# Patient Record
Sex: FEMALE | Race: BLACK OR AFRICAN AMERICAN | NOT HISPANIC OR LATINO | URBAN - METROPOLITAN AREA
[De-identification: names, ages, dates, MRNs, and addresses within clinical notes are randomized per-mention and may not be internally consistent; named-entity substitution may affect disease eponyms.]

---

## 2020-10-01 ENCOUNTER — EMERGENCY (EMERGENCY)
Facility: HOSPITAL | Age: 35
LOS: 1 days | Discharge: HOME | End: 2020-10-01
Attending: EMERGENCY MEDICINE
Payer: MEDICAID

## 2020-10-01 VITALS
RESPIRATION RATE: 19 BRPM | TEMPERATURE: 98 F | HEIGHT: 65 IN | WEIGHT: 279.99 LBS | HEART RATE: 100 BPM | DIASTOLIC BLOOD PRESSURE: 82 MMHG | OXYGEN SATURATION: 96 % | SYSTOLIC BLOOD PRESSURE: 120 MMHG

## 2020-10-01 DIAGNOSIS — Z98.84 BARIATRIC SURGERY STATUS: Chronic | ICD-10-CM

## 2020-10-01 LAB
ALBUMIN SERPL ELPH-MCNC: 2.8 G/DL — LOW (ref 3.5–5)
ALP SERPL-CCNC: 67 U/L — SIGNIFICANT CHANGE UP (ref 40–120)
ALT FLD-CCNC: 18 U/L DA — SIGNIFICANT CHANGE UP (ref 10–60)
ANION GAP SERPL CALC-SCNC: 7 MMOL/L — SIGNIFICANT CHANGE UP (ref 5–17)
ANION GAP SERPL CALC-SCNC: 8 MMOL/L — SIGNIFICANT CHANGE UP (ref 5–17)
APPEARANCE UR: ABNORMAL
APTT BLD: 28.3 SEC — SIGNIFICANT CHANGE UP (ref 27.5–35.5)
AST SERPL-CCNC: 19 U/L — SIGNIFICANT CHANGE UP (ref 10–40)
B PERT DNA SPEC QL NAA+PROBE: SIGNIFICANT CHANGE UP
BASOPHILS # BLD AUTO: 0.02 K/UL — SIGNIFICANT CHANGE UP (ref 0–0.2)
BASOPHILS NFR BLD AUTO: 0.2 % — SIGNIFICANT CHANGE UP (ref 0–2)
BILIRUB SERPL-MCNC: 0.3 MG/DL — SIGNIFICANT CHANGE UP (ref 0.2–1.2)
BILIRUB UR-MCNC: NEGATIVE — SIGNIFICANT CHANGE UP
BUN SERPL-MCNC: 10 MG/DL — SIGNIFICANT CHANGE UP (ref 7–18)
BUN SERPL-MCNC: 11 MG/DL — SIGNIFICANT CHANGE UP (ref 7–18)
C PNEUM DNA SPEC QL NAA+PROBE: SIGNIFICANT CHANGE UP
CALCIUM SERPL-MCNC: 9.4 MG/DL — SIGNIFICANT CHANGE UP (ref 8.4–10.5)
CALCIUM SERPL-MCNC: 9.4 MG/DL — SIGNIFICANT CHANGE UP (ref 8.4–10.5)
CHLORIDE SERPL-SCNC: 94 MMOL/L — LOW (ref 96–108)
CHLORIDE SERPL-SCNC: 95 MMOL/L — LOW (ref 96–108)
CO2 SERPL-SCNC: 29 MMOL/L — SIGNIFICANT CHANGE UP (ref 22–31)
CO2 SERPL-SCNC: 32 MMOL/L — HIGH (ref 22–31)
COLOR SPEC: YELLOW — SIGNIFICANT CHANGE UP
CREAT SERPL-MCNC: 1 MG/DL — SIGNIFICANT CHANGE UP (ref 0.5–1.3)
CREAT SERPL-MCNC: 1.04 MG/DL — SIGNIFICANT CHANGE UP (ref 0.5–1.3)
DIFF PNL FLD: ABNORMAL
EOSINOPHIL # BLD AUTO: 0.02 K/UL — SIGNIFICANT CHANGE UP (ref 0–0.5)
EOSINOPHIL NFR BLD AUTO: 0.2 % — SIGNIFICANT CHANGE UP (ref 0–6)
FLUAV H1 2009 PAND RNA SPEC QL NAA+PROBE: SIGNIFICANT CHANGE UP
FLUAV H1 RNA SPEC QL NAA+PROBE: SIGNIFICANT CHANGE UP
FLUAV H3 RNA SPEC QL NAA+PROBE: SIGNIFICANT CHANGE UP
FLUAV SUBTYP SPEC NAA+PROBE: SIGNIFICANT CHANGE UP
FLUBV RNA SPEC QL NAA+PROBE: SIGNIFICANT CHANGE UP
GLUCOSE SERPL-MCNC: 105 MG/DL — HIGH (ref 70–99)
GLUCOSE SERPL-MCNC: 105 MG/DL — HIGH (ref 70–99)
GLUCOSE UR QL: NEGATIVE — SIGNIFICANT CHANGE UP
HADV DNA SPEC QL NAA+PROBE: SIGNIFICANT CHANGE UP
HCG UR QL: NEGATIVE — SIGNIFICANT CHANGE UP
HCOV PNL SPEC NAA+PROBE: SIGNIFICANT CHANGE UP
HCT VFR BLD CALC: 34 % — LOW (ref 34.5–45)
HGB BLD-MCNC: 11.4 G/DL — LOW (ref 11.5–15.5)
HMPV RNA SPEC QL NAA+PROBE: SIGNIFICANT CHANGE UP
HPIV1 RNA SPEC QL NAA+PROBE: SIGNIFICANT CHANGE UP
HPIV2 RNA SPEC QL NAA+PROBE: SIGNIFICANT CHANGE UP
HPIV3 RNA SPEC QL NAA+PROBE: SIGNIFICANT CHANGE UP
HPIV4 RNA SPEC QL NAA+PROBE: SIGNIFICANT CHANGE UP
IMM GRANULOCYTES NFR BLD AUTO: 0.5 % — SIGNIFICANT CHANGE UP (ref 0–1.5)
INR BLD: 1.2 RATIO — HIGH (ref 0.88–1.16)
KETONES UR-MCNC: ABNORMAL
LACTATE SERPL-SCNC: 3.2 MMOL/L — HIGH (ref 0.7–2)
LEUKOCYTE ESTERASE UR-ACNC: NEGATIVE — SIGNIFICANT CHANGE UP
LYMPHOCYTES # BLD AUTO: 1.04 K/UL — SIGNIFICANT CHANGE UP (ref 1–3.3)
LYMPHOCYTES # BLD AUTO: 12.2 % — LOW (ref 13–44)
MCHC RBC-ENTMCNC: 29.3 PG — SIGNIFICANT CHANGE UP (ref 27–34)
MCHC RBC-ENTMCNC: 33.5 GM/DL — SIGNIFICANT CHANGE UP (ref 32–36)
MCV RBC AUTO: 87.4 FL — SIGNIFICANT CHANGE UP (ref 80–100)
MONOCYTES # BLD AUTO: 0.53 K/UL — SIGNIFICANT CHANGE UP (ref 0–0.9)
MONOCYTES NFR BLD AUTO: 6.2 % — SIGNIFICANT CHANGE UP (ref 2–14)
NEUTROPHILS # BLD AUTO: 6.85 K/UL — SIGNIFICANT CHANGE UP (ref 1.8–7.4)
NEUTROPHILS NFR BLD AUTO: 80.7 % — HIGH (ref 43–77)
NITRITE UR-MCNC: NEGATIVE — SIGNIFICANT CHANGE UP
NRBC # BLD: 0 /100 WBCS — SIGNIFICANT CHANGE UP (ref 0–0)
PH UR: 6 — SIGNIFICANT CHANGE UP (ref 5–8)
PLATELET # BLD AUTO: 224 K/UL — SIGNIFICANT CHANGE UP (ref 150–400)
POTASSIUM SERPL-MCNC: 2.5 MMOL/L — CRITICAL LOW (ref 3.5–5.3)
POTASSIUM SERPL-MCNC: 2.8 MMOL/L — CRITICAL LOW (ref 3.5–5.3)
POTASSIUM SERPL-SCNC: 2.5 MMOL/L — CRITICAL LOW (ref 3.5–5.3)
POTASSIUM SERPL-SCNC: 2.8 MMOL/L — CRITICAL LOW (ref 3.5–5.3)
PROT SERPL-MCNC: 8.3 G/DL — SIGNIFICANT CHANGE UP (ref 6–8.3)
PROT UR-MCNC: 30 MG/DL
PROTHROM AB SERPL-ACNC: 13.9 SEC — HIGH (ref 10.6–13.6)
RAPID RVP RESULT: SIGNIFICANT CHANGE UP
RBC # BLD: 3.89 M/UL — SIGNIFICANT CHANGE UP (ref 3.8–5.2)
RBC # FLD: 14.5 % — SIGNIFICANT CHANGE UP (ref 10.3–14.5)
RSV RNA SPEC QL NAA+PROBE: SIGNIFICANT CHANGE UP
RV+EV RNA SPEC QL NAA+PROBE: SIGNIFICANT CHANGE UP
SARS-COV-2 RNA SPEC QL NAA+PROBE: SIGNIFICANT CHANGE UP
SODIUM SERPL-SCNC: 131 MMOL/L — LOW (ref 135–145)
SODIUM SERPL-SCNC: 134 MMOL/L — LOW (ref 135–145)
SP GR SPEC: 1.02 — SIGNIFICANT CHANGE UP (ref 1.01–1.02)
TROPONIN I SERPL-MCNC: <0.015 NG/ML — SIGNIFICANT CHANGE UP (ref 0–0.04)
UROBILINOGEN FLD QL: 1
WBC # BLD: 8.5 K/UL — SIGNIFICANT CHANGE UP (ref 3.8–10.5)
WBC # FLD AUTO: 8.5 K/UL — SIGNIFICANT CHANGE UP (ref 3.8–10.5)

## 2020-10-01 PROCEDURE — 71045 X-RAY EXAM CHEST 1 VIEW: CPT | Mod: 26

## 2020-10-01 PROCEDURE — 99291 CRITICAL CARE FIRST HOUR: CPT

## 2020-10-01 RX ORDER — POTASSIUM CHLORIDE 20 MEQ
10 PACKET (EA) ORAL
Refills: 0 | Status: COMPLETED | OUTPATIENT
Start: 2020-10-01 | End: 2020-10-02

## 2020-10-01 RX ORDER — CEFEPIME 1 G/1
2000 INJECTION, POWDER, FOR SOLUTION INTRAMUSCULAR; INTRAVENOUS ONCE
Refills: 0 | Status: COMPLETED | OUTPATIENT
Start: 2020-10-01 | End: 2020-10-01

## 2020-10-01 RX ORDER — SODIUM CHLORIDE 9 MG/ML
1750 INJECTION INTRAMUSCULAR; INTRAVENOUS; SUBCUTANEOUS ONCE
Refills: 0 | Status: COMPLETED | OUTPATIENT
Start: 2020-10-01 | End: 2020-10-01

## 2020-10-01 RX ORDER — POTASSIUM CHLORIDE 20 MEQ
40 PACKET (EA) ORAL ONCE
Refills: 0 | Status: COMPLETED | OUTPATIENT
Start: 2020-10-01 | End: 2020-10-01

## 2020-10-01 RX ORDER — VANCOMYCIN HCL 1 G
1000 VIAL (EA) INTRAVENOUS ONCE
Refills: 0 | Status: COMPLETED | OUTPATIENT
Start: 2020-10-01 | End: 2020-10-01

## 2020-10-01 RX ORDER — ACETAMINOPHEN 500 MG
650 TABLET ORAL ONCE
Refills: 0 | Status: COMPLETED | OUTPATIENT
Start: 2020-10-01 | End: 2020-10-01

## 2020-10-01 RX ADMIN — Medication 250 MILLIGRAM(S): at 22:00

## 2020-10-01 RX ADMIN — Medication 650 MILLIGRAM(S): at 22:28

## 2020-10-01 RX ADMIN — SODIUM CHLORIDE 1750 MILLILITER(S): 9 INJECTION INTRAMUSCULAR; INTRAVENOUS; SUBCUTANEOUS at 21:56

## 2020-10-01 RX ADMIN — CEFEPIME 100 MILLIGRAM(S): 1 INJECTION, POWDER, FOR SOLUTION INTRAMUSCULAR; INTRAVENOUS at 21:57

## 2020-10-01 RX ADMIN — Medication 650 MILLIGRAM(S): at 21:56

## 2020-10-01 RX ADMIN — CEFEPIME 2000 MILLIGRAM(S): 1 INJECTION, POWDER, FOR SOLUTION INTRAMUSCULAR; INTRAVENOUS at 22:55

## 2020-10-01 RX ADMIN — Medication 40 MILLIEQUIVALENT(S): at 21:56

## 2020-10-01 NOTE — ED PROVIDER NOTE - CLINICAL SUMMARY MEDICAL DECISION MAKING FREE TEXT BOX
34yoF with h/o HTN, anxiety, gastric bypass 2017, presents with whole body/muscle and joint pains and subjective fever x 4 days. This pain includes the joints of her chest and muscles of her abdomen, back, knees, arms - pt confirms she has no specific or worse or other chest or abdominal pain. On ROS reports mild SOB but thinks this is likely 2/2 anxiety, is very anxious that she may have COVID and states she can not go home until she has COVID results due to having a baby at home. Denies leg pain or swelling, rashes, ST, dysuria, vaginal discharge (other than being on her period now), recent long distance travel, cough, known COVID exposure or hx, and all other symptoms. On exam, febrile, hemodynamically stable, saturating well on RA, NAD, well appearing, no WOB, speaking full sentences, chatting on phone, head NCAT, neck supple, full ROM, EOMI grossly, anicteric, MMM, no JVD or HJR, RRR, nml S1/S2, no m/r/g, lungs CTAB, no w/r/r, abd soft, NT, ND, nml BS, no rebound or guarding or Neves's, AAO, CN's 3-12 grossly intact, SMITH spontaneously, no leg cyanosis or edema, skin warm, well perfused, no rashes or hives, noted acute reproduction of tenderness with palpation of her anterior chest and back. Character low suspicion for pyelo and no urinary symptoms or UA findings. Low suspicion for PID or disseminated G/C. No cellulitis. No e/o meningitis or endocarditis. No e/o PNA on exam, hx, or CXR. No e/o fluid overload or cardiomyopathy. Character low suspicion for PE and no e/o DVT or hypoxia. Pt with no noted SOB here in ED throughout stay. Character of her symptoms c/w a viral syndrome. Also noted hypokalemia, pt states hx of this due to her gastric bypass and not taking her supplements. Given fluids, Tylenol, abx, potassium repletion. Signed off care to Dr. REGINO Jasso who will f/u rpt lactate, anticipate discharge.

## 2020-10-01 NOTE — ED PROVIDER NOTE - PATIENT PORTAL LINK FT
You can access the FollowMyHealth Patient Portal offered by [facility] by registering at the following website: [email or web address]. By joining [employer]s FollowMyHealth portal, you will also be able to view your health information using other applications (apps) compatible with our system.

## 2020-10-01 NOTE — ED ADULT NURSE NOTE - OBJECTIVE STATEMENT
came to Ed states having bodyaches and fever x 4 days, concerned it might be COvid . Seen and examined by Dr Guzman.

## 2020-10-01 NOTE — ED PROVIDER NOTE - PROGRESS NOTE DETAILS
I reassessed the patient, reviewed vital signs. Heart RRR, <2 sec cap refill, 2+ radial pulse, skin warm and dry with no tenting. Signed off care to Dr. REGINO Jasso who will f/u rpt lactate, anticipate discharge. Romero: repeat lactate WNL. given IV potassium replacement. likely viral syndrome. will discharge. f/u PMD. return precautions discussed.

## 2020-10-01 NOTE — ED ADULT NURSE NOTE - PAIN: PRESENCE, MLM
Scc Poorly Differentiated Histology Text: Full thickness keratinocyte atypia is observed microscopically. Focal dyskeratosis and apoptosis of keratinocytes is observed. The epidermis is acanthotic with growth of atypical keratinocytes beyond the level of the sebaceous glands, most keratinocytes show little to no keratinization. complains of pain/discomfort

## 2020-10-01 NOTE — ED PROVIDER NOTE - OBJECTIVE STATEMENT
34F w/ h/o HTN and anxiety p/w 4 days of subjective fevers, chills, body aches, and fatigue a/w abdominal pain and SOB. Abdominal pain is diffuse and a/w nausea, but no reported vomiting. SOB is exertional in nature w/ no associated orthopnea. No reported recent travel or known sick contacts.

## 2020-10-01 NOTE — ED PROVIDER NOTE - ATTENDING CONTRIBUTION TO CARE
34yoF with h/o HTN, anxiety, gastric bypass 2017, presents with whole body/muscle and joint pains and subjective fever x 4 days. This pain includes the joints of her chest and muscles of her abdomen, back, knees, arms - pt confirms she has no specific or worse or other chest or abdominal pain. On ROS reports mild SOB but thinks this is likely 2/2 anxiety, is very anxious that she may have COVID and states she can not go home until she has COVID results due to having a baby at home. Denies leg pain or swelling, rashes, ST, dysuria, vaginal discharge (other than being on her period now), recent long distance travel, cough, known COVID exposure or hx, and all other symptoms. On exam, febrile, hemodynamically stable, saturating well on RA, NAD, well appearing, no WOB, speaking full sentences, chatting on phone, head NCAT, neck supple, full ROM, EOMI grossly, anicteric, MMM, no JVD or HJR, RRR, nml S1/S2, no m/r/g, lungs CTAB, no w/r/r, abd soft, NT, ND, nml BS, no rebound or guarding or Neves's, AAO, CN's 3-12 grossly intact, SMITH spontaneously, no leg cyanosis or edema, skin warm, well perfused, no rashes or hives, noted acute reproduction of tenderness with palpation of her anterior chest and back. Character low suspicion for pyelo and no urinary symptoms or UA findings. No cellulitis. No e/o meningitis or endocarditis. No e/o PNA on exam, hx, or CXR. No e/o fluid overload or cardiomyopathy. Character low suspicion for PE and no e/o DVT or hypoxia. Pt with no noted SOB here in ED. Character of her symptoms c/w a viral syndrome. Also noted hypokalemia, pt states hx of this due to her gastric bypass and not taking her supplements. Given fluids, Tylenol, abx, potassium repletion. 34yoF with h/o HTN, anxiety, gastric bypass 2017, presents with whole body/muscle and joint pains and subjective fever x 4 days. This pain includes the joints of her chest and muscles of her abdomen, back, knees, arms - pt confirms she has no specific or worse or other chest or abdominal pain. On ROS reports mild SOB but thinks this is likely 2/2 anxiety, is very anxious that she may have COVID and states she can not go home until she has COVID results due to having a baby at home. Denies leg pain or swelling, rashes, ST, dysuria, vaginal discharge (other than being on her period now), recent long distance travel, cough, known COVID exposure or hx, and all other symptoms. On exam, febrile, hemodynamically stable, saturating well on RA, NAD, well appearing, no WOB, speaking full sentences, chatting on phone, head NCAT, neck supple, full ROM, EOMI grossly, anicteric, MMM, no JVD or HJR, RRR, nml S1/S2, no m/r/g, lungs CTAB, no w/r/r, abd soft, NT, ND, nml BS, no rebound or guarding or Neves's, AAO, CN's 3-12 grossly intact, SMITH spontaneously, no leg cyanosis or edema, skin warm, well perfused, no rashes or hives, noted acute reproduction of tenderness with palpation of her anterior chest and back. Character low suspicion for pyelo and no urinary symptoms or UA findings. Low suspicion for PID or disseminated G/C. No cellulitis. No e/o meningitis or endocarditis. No e/o PNA on exam, hx, or CXR. No e/o fluid overload or cardiomyopathy. Character low suspicion for PE and no e/o DVT or hypoxia. Pt with no noted SOB here in ED. Character of her symptoms c/w a viral syndrome. Also noted hypokalemia, pt states hx of this due to her gastric bypass and not taking her supplements. Given fluids, Tylenol, abx, potassium repletion. 34yoF with h/o HTN, anxiety, gastric bypass 2017, presents with whole body/muscle and joint pains and subjective fever x 4 days. This pain includes the joints of her chest and muscles of her abdomen, back, knees, arms - pt confirms she has no specific or worse or other chest or abdominal pain. On ROS reports mild SOB but thinks this is likely 2/2 anxiety, is very anxious that she may have COVID and states she can not go home until she has COVID results due to having a baby at home. Denies leg pain or swelling, rashes, ST, dysuria, vaginal discharge (other than being on her period now), recent long distance travel, cough, known COVID exposure or hx, and all other symptoms. On exam, febrile, hemodynamically stable, saturating well on RA, NAD, well appearing, no WOB, speaking full sentences, chatting on phone, head NCAT, neck supple, full ROM, EOMI grossly, anicteric, MMM, no JVD or HJR, RRR, nml S1/S2, no m/r/g, lungs CTAB, no w/r/r, abd soft, NT, ND, nml BS, no rebound or guarding or Neves's, AAO, CN's 3-12 grossly intact, SMITH spontaneously, no leg cyanosis or edema, skin warm, well perfused, no rashes or hives, noted acute reproduction of tenderness with palpation of her anterior chest and back. Character low suspicion for pyelo and no urinary symptoms or UA findings. Low suspicion for PID or disseminated G/C. No cellulitis. No e/o meningitis or endocarditis. No e/o PNA on exam, hx, or CXR. No e/o fluid overload or cardiomyopathy. Character low suspicion for PE and no e/o DVT or hypoxia. Pt with no noted SOB here in ED throughout stay. Character of her symptoms c/w a viral syndrome. Also noted hypokalemia, pt states hx of this due to her gastric bypass and not taking her supplements. Given fluids, Tylenol, abx, potassium repletion. Signed off care to Dr. REGINO Jasso who will f/u rpt lactate, anticipate discharge.

## 2020-10-02 VITALS
SYSTOLIC BLOOD PRESSURE: 116 MMHG | OXYGEN SATURATION: 97 % | RESPIRATION RATE: 18 BRPM | DIASTOLIC BLOOD PRESSURE: 70 MMHG | TEMPERATURE: 98 F | HEART RATE: 93 BPM

## 2020-10-02 LAB — LACTATE SERPL-SCNC: 1.2 MMOL/L — SIGNIFICANT CHANGE UP (ref 0.7–2)

## 2020-10-02 PROCEDURE — 96368 THER/DIAG CONCURRENT INF: CPT

## 2020-10-02 PROCEDURE — 85730 THROMBOPLASTIN TIME PARTIAL: CPT

## 2020-10-02 PROCEDURE — 84702 CHORIONIC GONADOTROPIN TEST: CPT

## 2020-10-02 PROCEDURE — 96367 TX/PROPH/DG ADDL SEQ IV INF: CPT

## 2020-10-02 PROCEDURE — 80048 BASIC METABOLIC PNL TOTAL CA: CPT

## 2020-10-02 PROCEDURE — 81001 URINALYSIS AUTO W/SCOPE: CPT

## 2020-10-02 PROCEDURE — 83735 ASSAY OF MAGNESIUM: CPT

## 2020-10-02 PROCEDURE — 99291 CRITICAL CARE FIRST HOUR: CPT | Mod: 25

## 2020-10-02 PROCEDURE — 87086 URINE CULTURE/COLONY COUNT: CPT

## 2020-10-02 PROCEDURE — 84484 ASSAY OF TROPONIN QUANT: CPT

## 2020-10-02 PROCEDURE — 93005 ELECTROCARDIOGRAM TRACING: CPT

## 2020-10-02 PROCEDURE — 83690 ASSAY OF LIPASE: CPT

## 2020-10-02 PROCEDURE — 81025 URINE PREGNANCY TEST: CPT

## 2020-10-02 PROCEDURE — 83605 ASSAY OF LACTIC ACID: CPT

## 2020-10-02 PROCEDURE — 85610 PROTHROMBIN TIME: CPT

## 2020-10-02 PROCEDURE — 85025 COMPLETE CBC W/AUTO DIFF WBC: CPT

## 2020-10-02 PROCEDURE — 80053 COMPREHEN METABOLIC PANEL: CPT

## 2020-10-02 PROCEDURE — 71045 X-RAY EXAM CHEST 1 VIEW: CPT

## 2020-10-02 PROCEDURE — 96376 TX/PRO/DX INJ SAME DRUG ADON: CPT

## 2020-10-02 PROCEDURE — 87040 BLOOD CULTURE FOR BACTERIA: CPT

## 2020-10-02 PROCEDURE — 36415 COLL VENOUS BLD VENIPUNCTURE: CPT

## 2020-10-02 PROCEDURE — 96366 THER/PROPH/DIAG IV INF ADDON: CPT

## 2020-10-02 PROCEDURE — 0225U NFCT DS DNA&RNA 21 SARSCOV2: CPT

## 2020-10-02 PROCEDURE — 83880 ASSAY OF NATRIURETIC PEPTIDE: CPT

## 2020-10-02 PROCEDURE — 96375 TX/PRO/DX INJ NEW DRUG ADDON: CPT

## 2020-10-02 RX ORDER — POTASSIUM CHLORIDE 20 MEQ
1 PACKET (EA) ORAL
Qty: 28 | Refills: 0
Start: 2020-10-02 | End: 2020-10-15

## 2020-10-02 RX ADMIN — Medication 100 MILLIEQUIVALENT(S): at 02:00

## 2020-10-02 RX ADMIN — Medication 100 MILLIEQUIVALENT(S): at 02:15

## 2020-10-02 RX ADMIN — Medication 100 MILLIEQUIVALENT(S): at 00:21

## 2020-10-02 RX ADMIN — Medication 1000 MILLIGRAM(S): at 00:55

## 2020-10-03 LAB
CULTURE RESULTS: SIGNIFICANT CHANGE UP
SPECIMEN SOURCE: SIGNIFICANT CHANGE UP

## 2020-10-07 LAB
CULTURE RESULTS: SIGNIFICANT CHANGE UP
CULTURE RESULTS: SIGNIFICANT CHANGE UP
SPECIMEN SOURCE: SIGNIFICANT CHANGE UP
SPECIMEN SOURCE: SIGNIFICANT CHANGE UP

## 2022-01-03 NOTE — ED PROVIDER NOTE - DOMESTIC TRAVEL HIGH RISK QUESTION
Do You Have A Family History Of Psoriasis?: no Is This A New Presentation, Or A Follow-Up?: Psoriasis Additional History: Patient states he is concerned about psoriasis on legs. Patient states he does not recall topical steroid he was using but would like a prescription. Patient states she has only been using Aquaphor on areas. Patient has joint aches (neck) but denies history arthritis. Patient denies pain in heel or swollen joints. Patient has not been evaluated by rheumatologist. Pt reports history of eczema and hay fever. Pt denies any current bowel issues but states he had ulcerative colitis as a child. No

## 2023-05-04 NOTE — ED ADULT NURSE NOTE - NS ED NURSE LEVEL OF CONSCIOUSNESS MENTAL STATUS
Alert
Tear Troughs Filler Volume In Cc: 0
Filler: Restylane Kysse
Additional Area 2 Volume In Cc: 0.3
Include Cannula Information In Note?: No
Filler Comments: weak border, does not need lateral volume
Map Statment: See Attach Map for Complete Details
Filler: Juvederm Ultra Plus XC
Vermilion Lips Filler Volume In Cc: 0.4
Lot #: [id number]
Anesthesia Type: 1% lidocaine with epinephrine
Aspiration Statement: Aspiration was performed prior to injecting site with filler.
Anesthesia Volume In Cc: 0.5
Expiration Date (Month Year): 02/24
Marionette Lines Filler Volume In Cc: 0.6
Lot #: [id number]
Additional Anesthesia Volume In Cc: 6
Expiration Date (Month Year): 11/23
Detail Level: Detailed
Additional Area 1 Location: central upper lip
Consent: Written consent obtained. Risks include but not limited to bruising, beading, irregular texture, ulceration, infection, allergic reaction, scar formation, incomplete augmentation, temporary nature, and procedural pain.
Filler Comments: tolerated well, but bruises easily. Recommend taking arnica sublingual prior to treatment.
Additional Area 2 Location: central lower lip
Post-Care Instructions: After the procedure, patient instructed to apply ice to reduce swelling.

## 2024-06-13 PROBLEM — I10 ESSENTIAL (PRIMARY) HYPERTENSION: Chronic | Status: ACTIVE | Noted: 2020-10-01

## 2024-06-13 PROBLEM — F41.9 ANXIETY DISORDER, UNSPECIFIED: Chronic | Status: ACTIVE | Noted: 2020-10-01

## 2024-06-25 PROBLEM — Z00.00 ENCOUNTER FOR PREVENTIVE HEALTH EXAMINATION: Status: ACTIVE | Noted: 2024-06-25

## 2024-07-01 ENCOUNTER — APPOINTMENT (OUTPATIENT)
Dept: NEUROLOGY | Facility: CLINIC | Age: 39
End: 2024-07-01

## 2024-12-18 ENCOUNTER — APPOINTMENT (OUTPATIENT)
Dept: PULMONOLOGY | Facility: CLINIC | Age: 39
End: 2024-12-18
Payer: MEDICAID

## 2024-12-18 VITALS
SYSTOLIC BLOOD PRESSURE: 106 MMHG | RESPIRATION RATE: 18 BRPM | HEIGHT: 65 IN | OXYGEN SATURATION: 97 % | DIASTOLIC BLOOD PRESSURE: 72 MMHG | TEMPERATURE: 97.8 F | HEART RATE: 76 BPM | WEIGHT: 293 LBS | BODY MASS INDEX: 48.82 KG/M2

## 2024-12-18 DIAGNOSIS — M54.30 SCIATICA, UNSPECIFIED SIDE: ICD-10-CM

## 2024-12-18 DIAGNOSIS — Z83.49 FAMILY HISTORY OF OTHER ENDOCRINE, NUTRITIONAL AND METABOLIC DISEASES: ICD-10-CM

## 2024-12-18 DIAGNOSIS — F17.200 NICOTINE DEPENDENCE, UNSPECIFIED, UNCOMPLICATED: ICD-10-CM

## 2024-12-18 DIAGNOSIS — Z82.49 FAMILY HISTORY OF ISCHEMIC HEART DISEASE AND OTHER DISEASES OF THE CIRCULATORY SYSTEM: ICD-10-CM

## 2024-12-18 DIAGNOSIS — G47.9 SLEEP DISORDER, UNSPECIFIED: ICD-10-CM

## 2024-12-18 DIAGNOSIS — M54.50 LOW BACK PAIN, UNSPECIFIED: ICD-10-CM

## 2024-12-18 DIAGNOSIS — Z83.3 FAMILY HISTORY OF DIABETES MELLITUS: ICD-10-CM

## 2024-12-18 DIAGNOSIS — Z86.79 PERSONAL HISTORY OF OTHER DISEASES OF THE CIRCULATORY SYSTEM: ICD-10-CM

## 2024-12-18 DIAGNOSIS — M10.271 DRUG-INDUCED GOUT, RIGHT ANKLE AND FOOT: ICD-10-CM

## 2024-12-18 PROCEDURE — 94060 EVALUATION OF WHEEZING: CPT

## 2024-12-18 PROCEDURE — 94727 GAS DIL/WSHOT DETER LNG VOL: CPT

## 2024-12-18 PROCEDURE — 99203 OFFICE O/P NEW LOW 30 MIN: CPT | Mod: 25

## 2024-12-18 PROCEDURE — 94729 DIFFUSING CAPACITY: CPT

## 2024-12-18 RX ORDER — ICOSAPENT ETHYL 1000 MG/1
1 CAPSULE ORAL
Refills: 0 | Status: ACTIVE | COMMUNITY

## 2024-12-18 RX ORDER — QUETIAPINE 50 MG/1
50 TABLET, FILM COATED ORAL
Refills: 0 | Status: ACTIVE | COMMUNITY
Start: 2024-12-18

## 2024-12-18 RX ORDER — FAMOTIDINE 20 MG/1
20 TABLET, FILM COATED ORAL
Refills: 0 | Status: ACTIVE | COMMUNITY

## 2024-12-18 RX ORDER — METOPROLOL TARTRATE AND HYDROCHLOROTHIAZIDE 100; 25 MG/1; MG/1
100-25 TABLET ORAL
Refills: 0 | Status: ACTIVE | COMMUNITY

## 2024-12-18 RX ORDER — ATORVASTATIN CALCIUM 10 MG/1
10 TABLET, FILM COATED ORAL
Refills: 0 | Status: ACTIVE | COMMUNITY

## 2024-12-18 RX ORDER — ALLOPURINOL 300 MG/1
300 TABLET ORAL
Refills: 0 | Status: ACTIVE | COMMUNITY

## 2025-02-26 ENCOUNTER — APPOINTMENT (OUTPATIENT)
Dept: PULMONOLOGY | Facility: CLINIC | Age: 40
End: 2025-02-26

## 2025-03-10 ENCOUNTER — APPOINTMENT (OUTPATIENT)
Dept: PULMONOLOGY | Facility: CLINIC | Age: 40
End: 2025-03-10
Payer: MEDICAID

## 2025-03-10 VITALS
BODY MASS INDEX: 48.82 KG/M2 | HEART RATE: 77 BPM | OXYGEN SATURATION: 98 % | DIASTOLIC BLOOD PRESSURE: 81 MMHG | SYSTOLIC BLOOD PRESSURE: 123 MMHG | WEIGHT: 293 LBS | RESPIRATION RATE: 16 BRPM | HEIGHT: 65 IN

## 2025-03-10 DIAGNOSIS — F17.200 NICOTINE DEPENDENCE, UNSPECIFIED, UNCOMPLICATED: ICD-10-CM

## 2025-03-10 DIAGNOSIS — R73.03 PREDIABETES.: ICD-10-CM

## 2025-03-10 DIAGNOSIS — M19.90 UNSPECIFIED OSTEOARTHRITIS, UNSPECIFIED SITE: ICD-10-CM

## 2025-03-10 DIAGNOSIS — G47.30 SLEEP APNEA, UNSPECIFIED: ICD-10-CM

## 2025-03-10 PROCEDURE — 99214 OFFICE O/P EST MOD 30 MIN: CPT

## 2025-03-10 RX ORDER — ALPRAZOLAM 2 MG/1
TABLET ORAL
Refills: 0 | Status: ACTIVE | COMMUNITY

## 2025-06-09 ENCOUNTER — NON-APPOINTMENT (OUTPATIENT)
Age: 40
End: 2025-06-09

## 2025-06-11 ENCOUNTER — APPOINTMENT (OUTPATIENT)
Dept: PULMONOLOGY | Facility: CLINIC | Age: 40
End: 2025-06-11